# Patient Record
Sex: MALE
[De-identification: names, ages, dates, MRNs, and addresses within clinical notes are randomized per-mention and may not be internally consistent; named-entity substitution may affect disease eponyms.]

---

## 2019-04-19 PROBLEM — Z00.00 ENCOUNTER FOR PREVENTIVE HEALTH EXAMINATION: Status: ACTIVE | Noted: 2019-04-19

## 2019-05-08 ENCOUNTER — TRANSCRIPTION ENCOUNTER (OUTPATIENT)
Age: 54
End: 2019-05-08

## 2019-05-08 ENCOUNTER — APPOINTMENT (OUTPATIENT)
Dept: ORTHOPEDIC SURGERY | Facility: CLINIC | Age: 54
End: 2019-05-08
Payer: COMMERCIAL

## 2019-05-08 VITALS — BODY MASS INDEX: 27.09 KG/M2 | HEIGHT: 72 IN | WEIGHT: 200 LBS

## 2019-05-08 DIAGNOSIS — M76.31 ILIOTIBIAL BAND SYNDROME, RIGHT LEG: ICD-10-CM

## 2019-05-08 DIAGNOSIS — Z78.9 OTHER SPECIFIED HEALTH STATUS: ICD-10-CM

## 2019-05-08 PROCEDURE — 73564 X-RAY EXAM KNEE 4 OR MORE: CPT | Mod: RT

## 2019-05-08 PROCEDURE — 99203 OFFICE O/P NEW LOW 30 MIN: CPT

## 2019-05-08 PROCEDURE — 73560 X-RAY EXAM OF KNEE 1 OR 2: CPT | Mod: LT

## 2019-07-22 PROBLEM — Z78.9 NON-SMOKER: Status: ACTIVE | Noted: 2019-07-22

## 2019-07-22 NOTE — ASSESSMENT
[FreeTextEntry1] : 53-year-old gentleman with 6 months of pain in his RIGHT lateral knee. By his history it sounds more like ITP but on exam he may have a lateral meniscus tear. It doesn't seem to be causing any loss of motion, mechanical symptoms or swelling. I recommended first trying a course of anti-inflammatories and physical therapy. Heat and ice. He should do stretches. He should avoid running or any painful activities until it's feeling better and then slowly work his way back.\par He may want to consider different sneakers or arch supports.\par He is suggested taking an anti-inflammatory consistently for about 10 days such as naproxen 2 tablets per day.\par He should followup in about 6 weeks. If the pain has not improved with the therapy I would like for him to get an MRI prior to the next visit to evaluate further.

## 2019-07-22 NOTE — HISTORY OF PRESENT ILLNESS
[de-identified] : Mr. Ricci is a 53-year-old gentleman who comes in with pain in his RIGHT knee going on since October 2018. There was no specific injury or event. He does run a lot and was running about 6 miles 3 times a week and 10 miles once a week and also biking intermittently when the pain began. Pain was sharp, 5/10 localized to the posterior lateral knee. When he gets the pain he cannot run. The pain starts up rather quickly when he runs. It also hurts with stairs and squatting. There is never any swelling, locking or buckling. Walking is fine. He was able to do long distance bike riding without any pain or problems as well. He hasn't done any treatment or workup. No prior knee problems.\par He does take 2 Advil a couple times a week because of the pain but hasn't taken it regularly or more than once a day.

## 2019-07-22 NOTE — PHYSICAL EXAM
[LE] : Sensory: Intact in bilateral lower extremities [DP] : dorsalis pedis 2+ and symmetric bilaterally [PT] : posterior tibial 2+ and symmetric bilaterally [Normal RLE] : Right Lower Extremity: No scars, rashes, lesions, ulcers, skin intact [Normal Touch] : sensation intact for touch [Normal] : Oriented to person, place, and time, insight and judgement were intact and the affect was normal [de-identified] : Knees:\par Nonantalgic gait. He can squat fully but has pain in the RIGHT lateral knee\par No effusion.\par - erythema, edema, warmth. Skin is intact.\par Tender posterior lateral joint line RIGHT knee. Nontender over the RIGHT ITB. Nontender elsewhere around either knee..\par ROM: 0 degrees extension to 135 degrees flexion. No crepitus\par +  Rosette RIGHT knee be producing the pain.\par Chandrika testing is + with a little more tightness RIGHT than LEFT knee\par 1A Lachman.  - Pivot shift. - posterior drawer. Normal rotational, varus/valgus laxity.\par Intact extensor mechanism.\par NVI distally.\par  [de-identified] : \par X-rays RIGHT knee weightbearing 4 views today show No abnormalities. There is a comparison AP view of the LEFT knee. Small bone island in the tibia and mild peaking of the tibial spines. There may be a tiny osteophytes at the patella but no narrowing of the patellofemoral joint [de-identified] : No respiratory distress

## 2019-08-27 PROBLEM — M23.8X1 CHONDRAL DEFECT OF CONDYLE OF RIGHT FEMUR: Status: ACTIVE | Noted: 2019-08-27

## 2019-08-27 PROBLEM — M23.200 OLD COMPLEX TEAR OF LATERAL MENISCUS OF RIGHT KNEE: Status: ACTIVE | Noted: 2019-08-27

## 2019-08-27 PROBLEM — M25.561 CHRONIC PAIN OF RIGHT KNEE: Status: ACTIVE | Noted: 2019-05-08

## 2019-08-28 ENCOUNTER — APPOINTMENT (OUTPATIENT)
Dept: ORTHOPEDIC SURGERY | Facility: CLINIC | Age: 54
End: 2019-08-28
Payer: COMMERCIAL

## 2019-08-28 DIAGNOSIS — M25.561 PAIN IN RIGHT KNEE: ICD-10-CM

## 2019-08-28 DIAGNOSIS — M23.8X1 OTHER INTERNAL DERANGEMENTS OF RIGHT KNEE: ICD-10-CM

## 2019-08-28 DIAGNOSIS — M23.200 DERANGEMENT OF UNSPECIFIED LATERAL MENISCUS DUE TO OLD TEAR OR INJURY, RIGHT KNEE: ICD-10-CM

## 2019-08-28 DIAGNOSIS — G89.29 PAIN IN RIGHT KNEE: ICD-10-CM

## 2019-08-28 PROCEDURE — 20610 DRAIN/INJ JOINT/BURSA W/O US: CPT | Mod: RT

## 2019-08-28 PROCEDURE — 99213 OFFICE O/P EST LOW 20 MIN: CPT | Mod: 25

## 2019-08-28 NOTE — DISCUSSION/SUMMARY
[de-identified] : 53-year-old with RIGHT knee pain and MRI showing degeneration and fraying or partial tear anterior horn lateral meniscus and full thickness chondral defect of the medial femoral condyle.\par Overall his knee is doing well but he has some mild residual pain mostly that occurs just running and not with normal activity. If he didn't run it seems like he probably wouldn't have any pain but he would like to continue running. I think he should run on a modified basis as he is doing. Ice after. Continue stretching and strengthening. He can go to therapy once a week as needed a little bit longer.\par He has some early chondral wear which may progress over time.\par We tried a single corticosteroid injection but would not want to do repeated injections. If pain is still present in 2 months he should come in for followup

## 2019-08-28 NOTE — PROCEDURE
[Injection] : Injection [Right] : of the right [Joint Pain] : Joint pain [Knee Joint] : knee joint [Patient] : patient [Risk] : risk [Alternatives] : alternatives [Benefits] : benefits [Bleeding] : bleeding [Infection] : infection [Verbal Consent Obtained] : verbal consent was obtained prior to the procedure [Allergic Reaction] : allergic reaction [Alcohol] : Alcohol [Betadine] : Betadine [Ethyl Chloride Spray] : ethyl chloride spray was used as a topical anesthetic [Lateral] : lateral [Anterior] : anterior [22] : a 22-gauge [1% Lidocaine___(mL)] : [unfilled] mL of 1% Lidocaine [Methylpred. 40mg/mL___(mL)] : [unfilled] mL of 40mg/mL methylprednisolone [Bandage Applied] : a bandage [Tolerated Well] : The patient tolerated the procedure well [None] : none [No Strenuous Activity___day(s)] : avoid strenuous activity for [unfilled] day(s) [PRN] : as needed

## 2019-08-28 NOTE — HISTORY OF PRESENT ILLNESS
[de-identified] : Mr. Ricci comes in for followup for his RIGHT knee pain. He was seen once about 3-1/2 months ago. He was referred to physical therapy.\par He went for an MRI of his knee last week which showed repetitive traction at the anterior root of the lateral meniscus with a small partial remote tear and degenerative signal in the anterior horn of the lateral meniscus and a focal full-thickness chondral defect in the medial femoral condyle weight-bearing surface measuring 1.3 cm.

## 2019-08-28 NOTE — PHYSICAL EXAM
[LE] : Sensory: Intact in bilateral lower extremities [DP] : dorsalis pedis 2+ and symmetric bilaterally [PT] : posterior tibial 2+ and symmetric bilaterally [Normal RLE] : Right Lower Extremity: No scars, rashes, lesions, ulcers, skin intact [Normal LLE] : Left Lower Extremity: No scars, rashes, lesions, ulcers, skin intact [Normal Touch] : sensation intact for touch [Normal] : Gait: normal [de-identified] : Knees\par Nonantalgic gait.\par - effusion.\par - erythema, edema, warmth.\par Tender Minimally lateral joint RIGHT knee. Nontender her ITB and hamstrings. No medial joint line tenderness. Nontender medial femoral condyle.\par ROM: 5 degrees hyperextension to 135 degrees flexion. No crepitus\par - Rosette.\par 1A Lachman.  - Pivot shift. - posterior drawer. Normal rotational, varus/valgus laxity.\par Intact extensor mechanism.\par NVI distally.\par  [de-identified] : \par MRI RIGHT knee August 23, 2019 was reviewed described above